# Patient Record
Sex: FEMALE | Race: WHITE | URBAN - METROPOLITAN AREA
[De-identification: names, ages, dates, MRNs, and addresses within clinical notes are randomized per-mention and may not be internally consistent; named-entity substitution may affect disease eponyms.]

---

## 2022-06-01 ENCOUNTER — OFFICE VISIT (OUTPATIENT)
Dept: OBGYN CLINIC | Facility: CLINIC | Age: 42
End: 2022-06-01
Payer: COMMERCIAL

## 2022-06-01 VITALS
HEART RATE: 78 BPM | BODY MASS INDEX: 22.15 KG/M2 | DIASTOLIC BLOOD PRESSURE: 74 MMHG | WEIGHT: 120.38 LBS | SYSTOLIC BLOOD PRESSURE: 116 MMHG | HEIGHT: 62 IN

## 2022-06-01 DIAGNOSIS — Z71.85 HPV VACCINE COUNSELING: ICD-10-CM

## 2022-06-01 DIAGNOSIS — Z13.0 SCREENING, ANEMIA, DEFICIENCY, IRON: ICD-10-CM

## 2022-06-01 DIAGNOSIS — Z13.21 SCREENING FOR ENDOCRINE, NUTRITIONAL, METABOLIC AND IMMUNITY DISORDER: ICD-10-CM

## 2022-06-01 DIAGNOSIS — Z30.09 GENERAL COUNSELING AND ADVICE FOR CONTRACEPTIVE MANAGEMENT: ICD-10-CM

## 2022-06-01 DIAGNOSIS — Z12.31 ENCOUNTER FOR SCREENING MAMMOGRAM FOR MALIGNANT NEOPLASM OF BREAST: ICD-10-CM

## 2022-06-01 DIAGNOSIS — Z80.41 FAMILY HISTORY OF OVARIAN CANCER: ICD-10-CM

## 2022-06-01 DIAGNOSIS — Z13.29 SCREENING FOR THYROID DISORDER: ICD-10-CM

## 2022-06-01 DIAGNOSIS — T19.2XXA RETAINED TAMPON, INITIAL ENCOUNTER: ICD-10-CM

## 2022-06-01 DIAGNOSIS — Z13.0 SCREENING FOR ENDOCRINE, NUTRITIONAL, METABOLIC AND IMMUNITY DISORDER: ICD-10-CM

## 2022-06-01 DIAGNOSIS — N92.3 INTERMENSTRUAL SPOTTING: ICD-10-CM

## 2022-06-01 DIAGNOSIS — Z13.220 SCREENING FOR LIPID DISORDERS: ICD-10-CM

## 2022-06-01 DIAGNOSIS — Z13.29 SCREENING FOR ENDOCRINE, NUTRITIONAL, METABOLIC AND IMMUNITY DISORDER: ICD-10-CM

## 2022-06-01 DIAGNOSIS — Z13.1 SCREENING FOR DIABETES MELLITUS: ICD-10-CM

## 2022-06-01 DIAGNOSIS — N92.0 MENORRHAGIA WITH REGULAR CYCLE: ICD-10-CM

## 2022-06-01 DIAGNOSIS — Z01.411 ENCOUNTER FOR WELL WOMAN EXAM WITH ABNORMAL FINDINGS: Primary | ICD-10-CM

## 2022-06-01 DIAGNOSIS — Z13.228 SCREENING FOR ENDOCRINE, NUTRITIONAL, METABOLIC AND IMMUNITY DISORDER: ICD-10-CM

## 2022-06-01 PROCEDURE — 87624 HPV HI-RISK TYP POOLED RSLT: CPT | Performed by: OBSTETRICS & GYNECOLOGY

## 2022-06-01 PROCEDURE — 3074F SYST BP LT 130 MM HG: CPT | Performed by: OBSTETRICS & GYNECOLOGY

## 2022-06-01 PROCEDURE — 99203 OFFICE O/P NEW LOW 30 MIN: CPT | Performed by: OBSTETRICS & GYNECOLOGY

## 2022-06-01 PROCEDURE — 99386 PREV VISIT NEW AGE 40-64: CPT | Performed by: OBSTETRICS & GYNECOLOGY

## 2022-06-01 PROCEDURE — 3008F BODY MASS INDEX DOCD: CPT | Performed by: OBSTETRICS & GYNECOLOGY

## 2022-06-01 PROCEDURE — 3078F DIAST BP <80 MM HG: CPT | Performed by: OBSTETRICS & GYNECOLOGY

## 2022-06-01 RX ORDER — AMOXICILLIN 250 MG
CAPSULE ORAL
COMMUNITY

## 2022-06-02 ENCOUNTER — TELEPHONE (OUTPATIENT)
Dept: OBGYN CLINIC | Facility: CLINIC | Age: 42
End: 2022-06-02

## 2022-06-02 LAB — HPV I/H RISK 1 DNA SPEC QL NAA+PROBE: NEGATIVE

## 2022-06-02 NOTE — TELEPHONE ENCOUNTER
RN spoke to Margot from THE [facility] lab. They received pt's specimen for hpv pap smear yesterday.

## 2022-06-07 PROBLEM — R87.615 UNSATISFACTORY CERVICAL PAPANICOLAOU SMEAR: Status: ACTIVE | Noted: 2022-06-01

## 2022-06-13 ENCOUNTER — TELEPHONE (OUTPATIENT)
Dept: OBGYN CLINIC | Facility: CLINIC | Age: 42
End: 2022-06-13

## 2022-11-10 ENCOUNTER — TELEPHONE (OUTPATIENT)
Facility: CLINIC | Age: 42
End: 2022-11-10

## 2022-11-10 ENCOUNTER — HOSPITAL ENCOUNTER (OUTPATIENT)
Dept: MAMMOGRAPHY | Facility: HOSPITAL | Age: 42
Discharge: HOME | End: 2022-11-10
Attending: OBSTETRICS & GYNECOLOGY
Payer: COMMERCIAL

## 2022-11-10 DIAGNOSIS — Z12.31 ENCOUNTER FOR SCREENING MAMMOGRAM FOR MALIGNANT NEOPLASM OF BREAST: ICD-10-CM

## 2022-11-10 PROCEDURE — 77063 BREAST TOMOSYNTHESIS BI: CPT | Performed by: OBSTETRICS & GYNECOLOGY

## 2022-11-10 PROCEDURE — 77067 SCR MAMMO BI INCL CAD: CPT | Performed by: OBSTETRICS & GYNECOLOGY

## 2022-11-10 NOTE — TELEPHONE ENCOUNTER
8/26/2020 Mammogram results: dense breast  Dr. Jero Willoughby recommends whole breast US  Routine screening mammography in 1  Year     Mammogram scheduled 11/10/22 today. Once resulted will call pt for recommendation. Advised pt to verify with her insurance if whole breast US is covered. Pt verb understanding.

## 2022-11-17 ENCOUNTER — OFFICE VISIT (OUTPATIENT)
Facility: CLINIC | Age: 42
End: 2022-11-17
Payer: COMMERCIAL

## 2022-11-17 VITALS
SYSTOLIC BLOOD PRESSURE: 116 MMHG | BODY MASS INDEX: 21.28 KG/M2 | DIASTOLIC BLOOD PRESSURE: 74 MMHG | HEART RATE: 70 BPM | HEIGHT: 62 IN | WEIGHT: 115.63 LBS

## 2022-11-17 DIAGNOSIS — R87.615 ENCOUNTER FOR REPEAT PAP SMEAR DUE TO PREVIOUS INSUFF CERVICAL CELLS: Primary | ICD-10-CM

## 2022-11-17 PROBLEM — R92.30 DENSE BREASTS: Status: ACTIVE | Noted: 2022-11-10

## 2022-11-17 PROBLEM — R92.2 DENSE BREASTS: Status: ACTIVE | Noted: 2022-11-10

## 2022-11-17 PROCEDURE — 87624 HPV HI-RISK TYP POOLED RSLT: CPT

## 2022-11-17 PROCEDURE — 3074F SYST BP LT 130 MM HG: CPT

## 2022-11-17 PROCEDURE — 88175 CYTOPATH C/V AUTO FLUID REDO: CPT

## 2022-11-17 PROCEDURE — 3078F DIAST BP <80 MM HG: CPT

## 2022-11-17 PROCEDURE — 99213 OFFICE O/P EST LOW 20 MIN: CPT

## 2022-11-17 PROCEDURE — 3008F BODY MASS INDEX DOCD: CPT

## 2022-11-18 ENCOUNTER — TELEPHONE (OUTPATIENT)
Facility: CLINIC | Age: 42
End: 2022-11-18

## 2022-11-18 LAB — HPV I/H RISK 1 DNA SPEC QL NAA+PROBE: NEGATIVE

## 2023-03-06 ENCOUNTER — TELEPHONE (OUTPATIENT)
Facility: CLINIC | Age: 43
End: 2023-03-06

## 2023-03-06 DIAGNOSIS — R92.2 BREAST DENSITY: ICD-10-CM

## 2023-03-06 DIAGNOSIS — N64.4 BREAST PAIN, LEFT: ICD-10-CM

## 2023-03-06 DIAGNOSIS — Z12.39 ENCOUNTER FOR BREAST CANCER SCREENING OTHER THAN MAMMOGRAM: Primary | ICD-10-CM

## 2023-03-06 NOTE — TELEPHONE ENCOUNTER
Spoke with pt. Had screening mammogram 8/26/22. Recommendation was for a whole breast ultrasound due to dense breast. Slight discomfort in left breast. I let her know mammography dept may require a unilateral diagnostic mammogram due to tenderness. Will pend breast ultrasound order and wait for mammography's recommendations for a left breast mammo. Verbalized understanding.

## 2023-03-07 PROBLEM — N64.4 BREAST PAIN, LEFT: Status: ACTIVE | Noted: 2023-03-07

## 2023-03-07 NOTE — TELEPHONE ENCOUNTER
Pt had wrong date of last mammogram. Last mammogram 11/10/22. Recommendations: Because of breast density this patient may benefit from supplemental screening with Molecular Breast Imaging (MBI) or bilateral screening breast ultrasound for increased sensitivity for detection of cancer which can be obscured mammographically. Will route for recommendations.

## 2023-03-08 NOTE — TELEPHONE ENCOUNTER
Spoke with pt. Aware DR. Sukh Frost placed an order for a bilateral diagnostic mammogram due to her complaints of left breast pain. Central scheduling number given. Verbalized understanding.

## 2023-03-14 ENCOUNTER — HOSPITAL ENCOUNTER (OUTPATIENT)
Dept: MAMMOGRAPHY | Facility: HOSPITAL | Age: 43
Discharge: HOME | End: 2023-03-14
Attending: OBSTETRICS & GYNECOLOGY
Payer: COMMERCIAL

## 2023-03-14 DIAGNOSIS — N64.4 BREAST PAIN, LEFT: ICD-10-CM

## 2023-03-14 PROCEDURE — 77066 DX MAMMO INCL CAD BI: CPT | Performed by: OBSTETRICS & GYNECOLOGY

## 2023-03-14 PROCEDURE — 76642 ULTRASOUND BREAST LIMITED: CPT | Performed by: OBSTETRICS & GYNECOLOGY

## 2023-03-14 PROCEDURE — 77062 BREAST TOMOSYNTHESIS BI: CPT | Performed by: OBSTETRICS & GYNECOLOGY

## 2023-03-15 NOTE — PROGRESS NOTES
Patient informed of diagnostic mammogram and US breast results and recommendation for routine screening mammogram in 12 months. Patient verbalizes understanding and agrees with the plan.

## 2023-12-31 ENCOUNTER — HOSPITAL ENCOUNTER (EMERGENCY)
Facility: HOSPITAL | Age: 43
Discharge: HOME | End: 2023-12-31
Attending: EMERGENCY MEDICINE
Payer: COMMERCIAL

## 2023-12-31 ENCOUNTER — APPOINTMENT (OUTPATIENT)
Dept: CT IMAGING | Facility: HOSPITAL | Age: 43
End: 2023-12-31
Attending: EMERGENCY MEDICINE
Payer: COMMERCIAL

## 2023-12-31 VITALS
BODY MASS INDEX: 19.1 KG/M2 | TEMPERATURE: 97 F | SYSTOLIC BLOOD PRESSURE: 102 MMHG | RESPIRATION RATE: 16 BRPM | OXYGEN SATURATION: 97 % | HEART RATE: 52 BPM | HEIGHT: 65 IN | DIASTOLIC BLOOD PRESSURE: 67 MMHG | WEIGHT: 114.63 LBS

## 2023-12-31 DIAGNOSIS — R11.2 NAUSEA AND VOMITING IN ADULT: Primary | ICD-10-CM

## 2023-12-31 LAB
ALBUMIN SERPL-MCNC: 4.6 G/DL (ref 3.4–5)
ALBUMIN/GLOB SERPL: 1.4 {RATIO} (ref 1–2)
ALP LIVER SERPL-CCNC: 55 U/L
ALT SERPL-CCNC: 20 U/L
ANION GAP SERPL CALC-SCNC: 5 MMOL/L (ref 0–18)
AST SERPL-CCNC: 11 U/L (ref 15–37)
B-HCG UR QL: NEGATIVE
BASOPHILS # BLD AUTO: 0.07 X10(3) UL (ref 0–0.2)
BASOPHILS NFR BLD AUTO: 0.8 %
BILIRUB SERPL-MCNC: 1.2 MG/DL (ref 0.1–2)
BILIRUB UR QL STRIP.AUTO: NEGATIVE
BUN BLD-MCNC: 12 MG/DL (ref 9–23)
CALCIUM BLD-MCNC: 9.5 MG/DL (ref 8.5–10.1)
CHLORIDE SERPL-SCNC: 101 MMOL/L (ref 98–112)
CLARITY UR REFRACT.AUTO: CLEAR
CO2 SERPL-SCNC: 30 MMOL/L (ref 21–32)
COLOR UR AUTO: YELLOW
CREAT BLD-MCNC: 0.89 MG/DL
EGFRCR SERPLBLD CKD-EPI 2021: 82 ML/MIN/1.73M2 (ref 60–?)
EOSINOPHIL # BLD AUTO: 0.1 X10(3) UL (ref 0–0.7)
EOSINOPHIL NFR BLD AUTO: 1.2 %
ERYTHROCYTE [DISTWIDTH] IN BLOOD BY AUTOMATED COUNT: 11.5 %
GLOBULIN PLAS-MCNC: 3.4 G/DL (ref 2.8–4.4)
GLUCOSE BLD-MCNC: 101 MG/DL (ref 70–99)
GLUCOSE UR STRIP.AUTO-MCNC: NORMAL MG/DL
HCT VFR BLD AUTO: 45.5 %
HGB BLD-MCNC: 16.5 G/DL
IMM GRANULOCYTES # BLD AUTO: 0.02 X10(3) UL (ref 0–1)
IMM GRANULOCYTES NFR BLD: 0.2 %
KETONES UR STRIP.AUTO-MCNC: 60 MG/DL
LEUKOCYTE ESTERASE UR QL STRIP.AUTO: NEGATIVE
LYMPHOCYTES # BLD AUTO: 2 X10(3) UL (ref 1–4)
LYMPHOCYTES NFR BLD AUTO: 23.7 %
MCH RBC QN AUTO: 31.1 PG (ref 26–34)
MCHC RBC AUTO-ENTMCNC: 36.3 G/DL (ref 31–37)
MCV RBC AUTO: 85.8 FL
MONOCYTES # BLD AUTO: 0.71 X10(3) UL (ref 0.1–1)
MONOCYTES NFR BLD AUTO: 8.4 %
NEUTROPHILS # BLD AUTO: 5.53 X10 (3) UL (ref 1.5–7.7)
NEUTROPHILS # BLD AUTO: 5.53 X10(3) UL (ref 1.5–7.7)
NEUTROPHILS NFR BLD AUTO: 65.7 %
NITRITE UR QL STRIP.AUTO: NEGATIVE
OSMOLALITY SERPL CALC.SUM OF ELEC: 282 MOSM/KG (ref 275–295)
PH UR STRIP.AUTO: 7 [PH] (ref 5–8)
PLATELET # BLD AUTO: 349 10(3)UL (ref 150–450)
POTASSIUM SERPL-SCNC: 3.7 MMOL/L (ref 3.5–5.1)
PROT SERPL-MCNC: 8 G/DL (ref 6.4–8.2)
PROT UR STRIP.AUTO-MCNC: 30 MG/DL
RBC # BLD AUTO: 5.3 X10(6)UL
SODIUM SERPL-SCNC: 136 MMOL/L (ref 136–145)
SP GR UR STRIP.AUTO: 1.03 (ref 1–1.03)
UROBILINOGEN UR STRIP.AUTO-MCNC: NORMAL MG/DL
WBC # BLD AUTO: 8.4 X10(3) UL (ref 4–11)

## 2023-12-31 PROCEDURE — S0028 INJECTION, FAMOTIDINE, 20 MG: HCPCS | Performed by: EMERGENCY MEDICINE

## 2023-12-31 PROCEDURE — 96361 HYDRATE IV INFUSION ADD-ON: CPT

## 2023-12-31 PROCEDURE — 74177 CT ABD & PELVIS W/CONTRAST: CPT | Performed by: EMERGENCY MEDICINE

## 2023-12-31 PROCEDURE — 85025 COMPLETE CBC W/AUTO DIFF WBC: CPT | Performed by: EMERGENCY MEDICINE

## 2023-12-31 PROCEDURE — 96375 TX/PRO/DX INJ NEW DRUG ADDON: CPT

## 2023-12-31 PROCEDURE — 99284 EMERGENCY DEPT VISIT MOD MDM: CPT

## 2023-12-31 PROCEDURE — 96374 THER/PROPH/DIAG INJ IV PUSH: CPT

## 2023-12-31 PROCEDURE — 99285 EMERGENCY DEPT VISIT HI MDM: CPT

## 2023-12-31 PROCEDURE — 81001 URINALYSIS AUTO W/SCOPE: CPT | Performed by: EMERGENCY MEDICINE

## 2023-12-31 PROCEDURE — 80053 COMPREHEN METABOLIC PANEL: CPT | Performed by: EMERGENCY MEDICINE

## 2023-12-31 PROCEDURE — 81025 URINE PREGNANCY TEST: CPT

## 2023-12-31 RX ORDER — ONDANSETRON 4 MG/1
4 TABLET, ORALLY DISINTEGRATING ORAL EVERY 4 HOURS PRN
Qty: 10 TABLET | Refills: 0 | Status: SHIPPED | OUTPATIENT
Start: 2023-12-31 | End: 2024-01-07

## 2023-12-31 RX ORDER — MORPHINE SULFATE 4 MG/ML
4 INJECTION, SOLUTION INTRAMUSCULAR; INTRAVENOUS ONCE
Status: COMPLETED | OUTPATIENT
Start: 2023-12-31 | End: 2023-12-31

## 2023-12-31 RX ORDER — ONDANSETRON 2 MG/ML
4 INJECTION INTRAMUSCULAR; INTRAVENOUS ONCE
Status: DISCONTINUED | OUTPATIENT
Start: 2023-12-31 | End: 2023-12-31

## 2023-12-31 RX ORDER — FAMOTIDINE 10 MG/ML
20 INJECTION, SOLUTION INTRAVENOUS ONCE
Status: COMPLETED | OUTPATIENT
Start: 2023-12-31 | End: 2023-12-31

## 2023-12-31 RX ORDER — ONDANSETRON 2 MG/ML
4 INJECTION INTRAMUSCULAR; INTRAVENOUS ONCE
Status: COMPLETED | OUTPATIENT
Start: 2023-12-31 | End: 2023-12-31

## 2023-12-31 RX ORDER — PANTOPRAZOLE SODIUM 40 MG/1
40 TABLET, DELAYED RELEASE ORAL DAILY
Qty: 30 TABLET | Refills: 0 | Status: SHIPPED | OUTPATIENT
Start: 2023-12-31 | End: 2024-01-30

## 2023-12-31 NOTE — ED INITIAL ASSESSMENT (HPI)
Pt reports mid upper abdominal pain and vomiting since. Denies any fevers or diarrhea. Pt reports burning pain that intensifies prior to her vomiting. States vomiting up to 4x a day.

## 2023-12-31 NOTE — DISCHARGE INSTRUCTIONS
Zofran for nausea  Drink plenty of fluids  Mylanta after meals and before bed  Start Protonix daily for 1 month  [place] diet advance as tolerated  Avoid caffeine and alcohol  Return if worse  Follow with your primary care physician next week  If symptoms are not improving may need evaluation by GI and possibly endoscopy.

## 2024-03-12 ENCOUNTER — TELEPHONE (OUTPATIENT)
Facility: CLINIC | Age: 44
End: 2024-03-12

## 2024-03-12 PROBLEM — R87.615 UNSATISFACTORY CERVICAL PAPANICOLAOU SMEAR: Status: RESOLVED | Noted: 2022-06-01 | Resolved: 2024-03-12

## 2024-03-12 NOTE — TELEPHONE ENCOUNTER
Discussed Dr. Vazquez recommendation to be seen in office by TK, appt tomm [id number]- agreed on POC Patient verbalized understanding, agreed to and intend to comply with plan of care.

## 2024-03-12 NOTE — TELEPHONE ENCOUNTER
Pt called to speak with a nurse about a left side breast pain that has gotten worse since last year, developed from a burning sensation to a sharp pain that comes an goes. [facility] scheduled for 4/10/24

## 2024-03-12 NOTE — TELEPHONE ENCOUNTER
C/o left side breast pain and inside her nipple that has gotten worse since last year, developed from a burning sensation to a intermittent sharp pain.    Last seen 11/2022    Onset few weeks     Hx same s/s last year dx mammogram and US was done 3/14/24. Has breast implants- pt can also reach out to her surgeon for recommendation, will route msg to Dr. Vazquez and call pt back.     [facility] scheduled for 4/10/24.     Patient verbalized understanding, agreed to and intend to comply with plan of care.

## 2024-03-13 ENCOUNTER — OFFICE VISIT (OUTPATIENT)
Facility: CLINIC | Age: 44
End: 2024-03-13
Payer: COMMERCIAL

## 2024-03-13 VITALS
HEIGHT: 62 IN | SYSTOLIC BLOOD PRESSURE: 120 MMHG | BODY MASS INDEX: 22.12 KG/M2 | HEART RATE: 75 BPM | DIASTOLIC BLOOD PRESSURE: 74 MMHG | WEIGHT: 120.19 LBS

## 2024-03-13 DIAGNOSIS — N64.4 BREAST PAIN, LEFT: Primary | ICD-10-CM

## 2024-03-13 PROCEDURE — 99212 OFFICE O/P EST SF 10 MIN: CPT

## 2024-03-13 NOTE — PROGRESS NOTES
Leigh Abbasi is a 43 year old female  Patient's last menstrual period was 2024 (exact date).   Chief Complaint   Patient presents with    Gyn Problem     Left breast constant pain started couple weeks ago     Other     Yes student    .  She had this same left breast pain last year.   OBSTETRICS HISTORY:  OB History    Para Term  AB Living   2 2 2     2   SAB IAB Ectopic Multiple Live Births           1      # Outcome Date GA Lbr Josse/2nd Weight Sex Delivery Anes PTL Lv   2 Term 14 39w0d   M Caesarean      1 Term 07 41w0d  7 lb (3.175 kg) F Caesarean   ROBB      Birth Comments: fetal heart tone issues. Wouldn't dilate      Obstetric Comments    - was difficult to conceive. Oral ovulation induction agent & inseminations & conceived right way.        GYNE HISTORY:      History   Sexual Activity    Sexual activity: Yes    Partners: Male    Birth control/ protection: Vasectomy                 MEDICAL HISTORY:  Past Medical History:   Diagnosis Date    Breast implant status     bilateral breast implants    Breast pain, left 2023    3/7/23 - c/o left breast pain at [facility]. Pap unsatisfactory   3/14/23 Diagnostic mammogram & US - A cyst in the left breast 4 o'clock position 7 cm from the nipple measures 3 x 4 x 2 mm.    Breast pain, left 2024    3/12/24 - Patient called to c/o left side breast pain and inside her nipple that has gotten worse since last year, developed from a burning sensation to a intermittent sharp pain. Onset a few weeks.    Cancer screening 2022    [employer] Common Hereditary Cancer Panel = Negative    Dense breasts 11/10/2022    c - Heterogeneously dense    Dysmenorrhea     Family history of ovarian cancer     mother, maternal grandmother    Genital herpes     1st outbreak occurred during 1st pregnancy     Menorrhagia     Open wound of finger 2008    sutures for laceration left thumb    Unsatisfactory cervical Papanicolaou smear  2022       SURGICAL HISTORY:  Past Surgical History:   Procedure Laterality Date    Abdominoplasty  2014      2007      2014    Enlarge breast with implant Bilateral 2014    [place] teeth removed         SOCIAL HISTORY:  Social History     Socioeconomic History    Marital status:      Spouse name: Not on file    Number of children: Not on file    Years of education: Not on file    Highest education level: Not on file   Occupational History    Not on file   Tobacco Use    Smoking status: Former    Smokeless tobacco: Never    Tobacco comments:     ocassionally in college   Vaping Use    Vaping Use: Never used   Substance and Sexual Activity    Alcohol use: Yes     Comment: occ    Drug use: Never    Sexual activity: Yes     Partners: Male     Birth control/protection: Vasectomy   Other Topics Concern    Not on file   Social History Narrative    Not on file     Social Determinants of Health     Financial Resource Strain: Not on file   Food Insecurity: Not on file   Transportation Needs: Not on file   Physical Activity: Not on file   Stress: Not on file   Social Connections: Not on file   Housing Stability: Not on file       FAMILY HISTORY:  Family History   Problem Relation Age of Onset    Diabetes Father         diet controlled     Ovarian Cancer Mother 59        was caught early. No chemo. Not sure about genetic mutations     No Known Problems Son     Ovarian Cancer Maternal Grandmother 40         when patient's mom was 10 years old.     Other (Liver cancer) Paternal Grandmother     Cancer Maternal Aunt 40        uncertain kind. Had chemo    No Known Problems Half-Sister     Stroke Maternal Grandfather     No Known Problems Daughter     Breast Cancer Neg     Colon Cancer Neg     Endometriosis Neg     Infertility Neg     Thyroid disease Neg     Clotting Disorder Neg     Birth Defects Neg     Bleeding Disorders Neg     Genetic Disease Neg     Osteoporosis Neg         MEDICATIONS:    Current Outpatient Medications:     Fish Oil-Cholecalciferol (FISH OIL + D3 OR), Take by mouth., Disp: , Rfl:     Multiple Vitamins-Minerals (BIOTIN PLUS/CALCIUM/VIT D3) Oral Tab, Take by mouth., Disp: , Rfl:     ALLERGIES:  No Known Allergies      PHYSICAL EXAM:   Physical Exam  Chest:   Breasts:     Right: Normal. No swelling, bleeding, inverted nipple, mass, nipple discharge, skin change or tenderness.      Left: Tenderness present. No swelling, bleeding, inverted nipple, mass, nipple discharge or skin change.      Comments: Has tenderness and pain at the outer quadrant from the 1 oclock to 6 oclock position and has pain in the left areola area.           Assessment & Plan:  1. Breast pain, left    - DUANE DIAGNOSTIC LEFT (CPT=77065); Future  Discussed with patient if diagnostic mammogram is benign, recommend to follow up with her plastic surgeon or can refer her to Dr. Ford.

## 2024-03-25 ENCOUNTER — TELEPHONE (OUTPATIENT)
Facility: CLINIC | Age: 44
End: 2024-03-25

## 2024-03-25 DIAGNOSIS — N64.4 BREAST PAIN: Primary | ICD-10-CM

## 2024-03-25 NOTE — TELEPHONE ENCOUNTER
Per Temitope from the  mammography department, pt needs a bilateral study, she needs bilateral diagnostic and augmented because of the implants. Pt is due for both sides. Please advise and correct order. Thanks

## 2024-03-26 ENCOUNTER — HOSPITAL ENCOUNTER (OUTPATIENT)
Dept: MAMMOGRAPHY | Facility: HOSPITAL | Age: 44
Discharge: HOME | End: 2024-03-26
Payer: COMMERCIAL

## 2024-03-26 DIAGNOSIS — N64.4 BREAST PAIN: ICD-10-CM

## 2024-03-26 PROCEDURE — 77066 DX MAMMO INCL CAD BI: CPT

## 2024-03-26 PROCEDURE — 77062 BREAST TOMOSYNTHESIS BI: CPT

## 2024-03-26 PROCEDURE — 76642 ULTRASOUND BREAST LIMITED: CPT

## 2024-03-27 DIAGNOSIS — Z80.41 FAMILY HISTORY OF OVARIAN CANCER: Primary | ICD-10-CM

## 2024-03-27 DIAGNOSIS — N64.4 BREAST PAIN: ICD-10-CM

## 2024-03-27 NOTE — PROGRESS NOTES
Patient aware of mammogram and US results and recommendations. A breast MRI is recommended for this patient. Please give her information to follow up  with Dr. Ford. [employer] message sent per patient request. Patient verbalized understanding.

## 2024-06-13 NOTE — CONSULTS
Breast Surgery New Patient Consultation    Leigh Abbasi is a 44 year old patient, referred by Meghana NIEVES, also a patient of Dr. Vazquez, who presents for evaluation of breast pain.    History of Present Illness:   Ms. Leigh Abbasi is a 44 year old woman with a past history significant for bilateral breast implants who presents for evaluation of breast pain.     Her pain has been intermittent for about 2 years. She states it is like a \"discomfort\" or \"soreness\". She has had breast implants for 10 years and is contemplating removal/breast reduction. She wants to make sure she is healthy before a cosmetic surgery.     Her most recent mammogram was on 3/26/2024.  Breast tissue was category C density.  There were no significant findings on mammogram or ultrasound.  An MRI breast was recommended due to persistent breast pain and a family history of ovarian cancer.  Her mammogram was deemed BIRAD 0 due to this. Mammogram in March 2023 showed a cyst in the left breast 4:00, 7 cm from the nipple, measuring 3 x 4 x 2 mm.    She denies any palpable masses, nipple discharge, skin changes, or axillary adenopathy.  She does not have significant past history for breast diseases or breast biopsy. She does not have family history of breast cancer.  She has a family history of ovarian cancer.  Her mother had ovarian cancer at 55 years old and is still alive at 68, her maternal grandmother had ovarian cancer at 40 years old and passed away at 45.  She does have a history of genetic testing in 2023 which was negative.          Past Medical History:    Breast implant status    bilateral breast implants    Breast pain, left    3/7/23 - c/o left breast pain at [facility]. Pap unsatisfactory   3/14/23 Diagnostic mammogram & US - A cyst in the left breast 4 o'clock position 7 cm from the nipple measures 3 x 4 x 2 mm.    Breast pain, left    3/12/24 - Patient called to c/o left side breast pain and inside her nipple that has  gotten worse since last year, developed from a burning sensation to a intermittent sharp pain. Onset a few weeks.    Cancer screening    Inv[employer]ae Common Hereditary Cancer Panel = Negative    Dense breasts    c - Heterogeneously dense    Dysmenorrhea    Family history of ovarian cancer    mother, maternal grandmother    Genital herpes    1st outbreak occurred during 1st pregnancy     Menorrhagia    Open wound of finger    sutures for laceration left thumb    Unsatisfactory cervical Papanicolaou smear       Past Surgical History:   Procedure Laterality Date    Abdominoplasty                  Enlarge breast with implant Bilateral     [place] teeth removed         Gynecological History:  Menarche at age 10 and LMP 6/15/24  Pt is a   Pt was 27 years old at time of first pregnancy.    She has cumulative breastfeeding history of 12 months  Age of Menopause: n/a  Type: n/a  She denies any history of hormone replacement therapy   She has history of oral contraceptive use for 5 years, last .   She has recieved infertility treatment to achieve pregnancy.    Medications:     Fish Oil-Cholecalciferol (FISH OIL + D3 OR) Take by mouth.      Multiple Vitamins-Minerals (BIOTIN PLUS/CALCIUM/VIT D3) Oral Tab Take by mouth.         Allergies:    No Known Allergies    Family History:   Family History   Problem Relation Age of Onset    Diabetes Father         diet controlled     Ovarian Cancer Mother 59        was caught early. No chemo. Not sure about genetic mutations     No Known Problems Son     Ovarian Cancer Maternal Grandmother 40         when patient's mom was 10 years old.     Other (Liver cancer) Paternal Grandmother     Cancer Maternal Aunt 40        uncertain kind. Had chemo    No Known Problems Half-Sister     Stroke Maternal Grandfather     No Known Problems Daughter     Breast Cancer Neg     Colon Cancer Neg     Endometriosis Neg     Infertility Neg     Thyroid disease Neg      Clotting Disorder Neg     Birth Defects Neg     Bleeding Disorders Neg     Genetic Disease Neg     Osteoporosis Neg        She is not of Ashkenazi Christianity ancestry.    Social History:  History   Alcohol Use    Yes     Comment: occ       History   Smoking Status    Former   Smokeless Tobacco    Never     Comment: ocassionally in college       Review of Systems:    Review of Systems   Constitutional:  Negative for activity change, appetite change, chills, fatigue and unexpected weight change.   HENT:  Negative for ear pain, hearing loss, nosebleeds, sore throat, trouble swallowing and voice change.    Eyes:  Negative for pain and visual disturbance.   Respiratory:  Negative for cough, chest tightness and shortness of breath.    Cardiovascular:  Negative for chest pain, palpitations and leg swelling.   Gastrointestinal:  Negative for nausea, vomiting, abdominal pain, diarrhea, constipation and blood in stool.   Endocrine: Negative for cold intolerance and heat intolerance.   Genitourinary:  Negative for dysuria, hematuria and difficulty urinating.   Musculoskeletal:  Negative for back pain, joint swelling, joint pain and neck pain.   Skin:  Negative for color change, rash and wound.   Allergic/Immunologic: Negative for environmental allergies.   Neurological:  Negative for tremors, syncope, facial asymmetry, speech difficulty and weakness.   Hematological:  Negative for adenopathy. Does not bruise/bleed easily.   Psychiatric/Behavioral:  Negative for hallucinations, behavioral problems, confusion, agitation and depressed mood.       Otherwise as per HPI.    Physical Exam:    /65 (BP Location: Right arm, Patient Position: Sitting, Cuff Size: adult)   Pulse 55   Temp 98.5 °F (36.9 °C)   Resp 16   Wt 53.1 kg (117 lb)   LMP 03/13/2024 (Exact Date)   SpO2 99%   BMI 21.40 kg/m²     Physical Exam  Vitals reviewed.   Constitutional:       Appearance: Normal appearance.   HENT:      Head: Normocephalic and  atraumatic.   Eyes:      Extraocular Movements: Extraocular movements intact.      Pupils: Pupils are equal, round, and reactive to light.   Cardiovascular:      Rate and Rhythm: Normal rate.   Pulmonary:      Effort: Pulmonary effort is normal.   Chest:   Breasts:     Right: Normal. No mass, nipple discharge, skin change or tenderness.      Left: Normal. No mass, nipple discharge, skin change or tenderness.      Comments: Bilateral implants present  Abdominal:      General: Abdomen is flat.      Palpations: Abdomen is soft.   Musculoskeletal:         General: Normal range of motion.      Cervical back: Normal range of motion and neck supple.   Lymphadenopathy:      Upper Body:      Right upper body: No supraclavicular or axillary adenopathy.      Left upper body: No supraclavicular or axillary adenopathy.   Skin:     General: Skin is warm and dry.   Neurological:      General: No focal deficit present.      Mental Status: She is alert and oriented to person, place, and time.   Psychiatric:         Mood and Affect: Mood normal.            Labs/imaging: reviewed in EPIC    Impression:   Ms. Leigh Abbasi is a 44 year old woman presents for high risk of breast cancer and left breast pain    Discussion and Plan:  I had a discussion with the Patient regarding her breast exam. On exam today I found no evidence of disease. I personally reviewed her recent imaging and we discussed this.    We discussed the patient's breast pain. I explained that breast pain is usually secondary to hormonal shifts and not usually a sign of worrisome pathology. There were no masses on physical exam and her most recent imaging is reassuring. I explained that most of the time breast pain will dissipate with time.  We also discussed abstaining from caffeine. We discussed wearing a good, supportive, well-fitted bra in the correct size. If needed, the bra can be worn 24/7 for support. The patient may use NSAIDs as needed and primrose oil may  be helpful.     We calculated her Tyrer Cuzick score, which revealed an estimated lifetime breast cancer risk of approximately 10% and a 10-year breast cancer risk of 1.9%. (Results will be scanned into the chart.)     Genetic counseling: she has completed genetic testing and was negative    Further screening:   Mammogram: Next annual mammogram March 2025 (ordered)  Screening ultrasound: Will start screening ultrasound due to dense breast tissue if MRI is not able to be completed  MRI: ordered breast MRI to follow up on diagnostic mammogram. Patient has been having issues with insurance approval     Chemoprophylaxis: n/a    Return to clinic:   1 year for breast exam  See PCP/OB/GYN at least annually for additional breast exam    She was given ample opportunity for questions and those questions were answered to her satisfaction. She has been  encouraged to contact the office with any questions or concerns prior to her next appointment. This encounter lasted a total of 40 minutes, more than 50% of which was dedicated to the discussion of management options.     Kayla Ford MD  Breast Surgical Oncology      CC: Dr. George NIEVES

## 2024-06-18 ENCOUNTER — OFFICE VISIT (OUTPATIENT)
Dept: SURGERY | Facility: CLINIC | Age: 44
End: 2024-06-18

## 2024-06-18 VITALS
BODY MASS INDEX: 21 KG/M2 | SYSTOLIC BLOOD PRESSURE: 124 MMHG | HEART RATE: 55 BPM | OXYGEN SATURATION: 99 % | WEIGHT: 117 LBS | RESPIRATION RATE: 16 BRPM | TEMPERATURE: 99 F | DIASTOLIC BLOOD PRESSURE: 65 MMHG

## 2024-06-18 DIAGNOSIS — R92.333 HETEROGENEOUSLY DENSE TISSUE OF BOTH BREASTS ON MAMMOGRAPHY: Primary | ICD-10-CM

## 2024-06-18 PROCEDURE — 99204 OFFICE O/P NEW MOD 45 MIN: CPT | Performed by: SURGERY

## 2024-06-18 NOTE — PATIENT INSTRUCTIONS
Annual mammogram for screening has been ordered march 2025  MRI has been re-ordered  You also qualify for screening ultrasound, will order when we figure out if MRI covered or not  Return for breast exam in 1 year

## 2024-08-21 PROBLEM — D12.5 BENIGN NEOPLASM OF SIGMOID COLON: Status: ACTIVE | Noted: 2024-08-21

## 2024-08-21 PROBLEM — Z12.11 SPECIAL SCREENING FOR MALIGNANT NEOPLASM OF COLON: Status: ACTIVE | Noted: 2024-08-21

## 2024-08-21 PROBLEM — K21.9 GASTROESOPHAGEAL REFLUX DISEASE: Status: ACTIVE | Noted: 2024-08-21

## 2024-08-21 PROBLEM — R11.12 PROJECTILE VOMITING: Status: ACTIVE | Noted: 2024-08-21

## 2024-11-04 ENCOUNTER — NURSE ONLY (OUTPATIENT)
Dept: LAB | Age: 44
End: 2024-11-04
Attending: INTERNAL MEDICINE
Payer: COMMERCIAL

## 2024-11-04 PROCEDURE — 83013 H PYLORI (C-13) BREATH: CPT | Performed by: INTERNAL MEDICINE

## 2025-03-10 ENCOUNTER — TELEPHONE (OUTPATIENT)
Facility: CLINIC | Age: 45
End: 2025-03-10

## 2025-03-10 DIAGNOSIS — M25.511 RIGHT SHOULDER PAIN, UNSPECIFIED CHRONICITY: Primary | ICD-10-CM

## 2025-03-10 NOTE — TELEPHONE ENCOUNTER
New pt appt for rt shoulder/bicep no imaging avail, pt advised to arrive early for imaging   Future Appointments   Date Time Provider Department Center   3/27/2025 11:40 AM Beto Bagley,  [facility]

## 2025-05-08 ENCOUNTER — OFFICE VISIT (OUTPATIENT)
Facility: CLINIC | Age: 45
End: 2025-05-08
Payer: COMMERCIAL

## 2025-05-08 VITALS
BODY MASS INDEX: 20.49 KG/M2 | HEIGHT: 65 IN | SYSTOLIC BLOOD PRESSURE: 104 MMHG | HEART RATE: 62 BPM | DIASTOLIC BLOOD PRESSURE: 62 MMHG | WEIGHT: 123 LBS

## 2025-05-08 DIAGNOSIS — Z28.21 HUMAN PAPILLOMA VIRUS (HPV) VACCINATION DECLINED: ICD-10-CM

## 2025-05-08 DIAGNOSIS — Z12.4 SCREENING FOR CERVICAL CANCER: ICD-10-CM

## 2025-05-08 DIAGNOSIS — Z80.41 FAMILY HISTORY OF OVARIAN CANCER: ICD-10-CM

## 2025-05-08 DIAGNOSIS — N92.0 MENORRHAGIA WITH REGULAR CYCLE: ICD-10-CM

## 2025-05-08 DIAGNOSIS — Z98.82 BREAST IMPLANT STATUS: ICD-10-CM

## 2025-05-08 DIAGNOSIS — N88.9 CERVIX ABNORMALITY: ICD-10-CM

## 2025-05-08 DIAGNOSIS — R92.30 DENSE BREASTS: ICD-10-CM

## 2025-05-08 DIAGNOSIS — Z12.73 SCREENING FOR OVARIAN CANCER: ICD-10-CM

## 2025-05-08 DIAGNOSIS — Z01.411 ENCOUNTER FOR WELL WOMAN EXAM WITH ABNORMAL FINDINGS: Primary | ICD-10-CM

## 2025-05-08 PROCEDURE — 88175 CYTOPATH C/V AUTO FLUID REDO: CPT | Performed by: OBSTETRICS & GYNECOLOGY

## 2025-05-08 PROCEDURE — 99396 PREV VISIT EST AGE 40-64: CPT | Performed by: OBSTETRICS & GYNECOLOGY

## 2025-05-08 PROCEDURE — 87624 HPV HI-RISK TYP POOLED RSLT: CPT | Performed by: OBSTETRICS & GYNECOLOGY

## 2025-05-08 PROCEDURE — 99213 OFFICE O/P EST LOW 20 MIN: CPT | Performed by: OBSTETRICS & GYNECOLOGY

## 2025-05-08 PROCEDURE — 88305 TISSUE EXAM BY PATHOLOGIST: CPT | Performed by: OBSTETRICS & GYNECOLOGY

## 2025-05-08 PROCEDURE — 57500 BIOPSY OF CERVIX: CPT | Performed by: OBSTETRICS & GYNECOLOGY

## 2025-05-08 RX ORDER — MULTIVIT-MIN/IRON/FOLIC ACID/K 18-600-40
CAPSULE ORAL
COMMUNITY

## 2025-05-08 NOTE — PROCEDURES
2025    Procedure: Biopsy of cervix     Pre-op Dx: Lesion of ectocervix at 9 o'clock     Post-op Dx: Same    Indication: 44 year old  female with menorrhagia here for well woman exam. Incidentally noted approximately 5 mm irregularly bordered blue-gray area located at 9 o'clock on the ectocervix. Non friable. Recommended biopsy. Patient in agreement to proceed.     Risks, benefits, alternatives discussed.     Speculum already in place  Punch biopsy performed at 9 o'clock - this did not feel very solid and there was not much bleeding. Could be that this was just an unusual appearing Nabothian cyst.   Monsel's solution applied.   Hemostatic biopsy site  Speculum removed.   Tolerated well  EBL minimal  Specimen: biopsy of cervical lesion at 9 o'clock     Nirali Vazquez MD

## 2025-05-08 NOTE — H&P
[facility]  Obstetrics and Gynecology   History & Physical  NEW    Chief complaint:   Chief Complaint   Patient presents with    Wellness Visit     Last pap 22 neg        Subjective:     HPI: Leigh Abbasi is a 44 year old  Patient's last menstrual period was 2025 (approximate).   Here for [employer]. Reports periods are still regular, but the first 2 days are quite heavy. Would like to have pelvic US done as previously advised   Chaperone declines    PMH Menorrhagia, Persistent issues with left breast pain, breast density. +FHx ovarian cancer in her mother & matGM (patient with negative genetic testing 2022)    ~~~~~~  22 Initial visit with me - [employer] & c/o unusual spotting x 4 days prior to onset of last period. Last month had a period slightly early - had spotting starting on  & then full bleeding . Has never had this problem before.   Usual cramping. Not sure if was cramping with the spotting. Some night sweats - can feel cold at times. Right before & at the start of the period. This comes & goes. Gets it just a few times a year. Labs - thinks last was about 1 year ago & was normal. Pelvic US - nothing since pregnancy in . Exam: Retained tampon removed from vagina. Pelvic US & labs ordered. Pap & HPV done though returned unsatisfactory. Discussed HPV vaccine, genetic counseling, hereditary mutation testing, consideration of screening for ovarian cancer with pelvic US & CA-125 though limited utility.     ~~~~~~~~~  22 Pap & HPV negative  - Meghana Abebe APRN  ~~~~~~~~~~~  3/6/23 Patient called office c/o discomfort left breast. Diagnostic mammogram ordered.   3/14/23 Diagnostic mammogram - tiny benign cyst left breast. Benign   ~~~~~~~~~~~~  3/12/24 Pt called office c/o left breast pain again - left side breast pain and inside her nipple that has gotten worse since last year, developed from a burning sensation to a intermittent sharp pain. For a few weeks.      ~~~~~~~~~~~~~  3/13/24 Meghana Abebe APRN - Left breast pain. Exam Has tenderness and pain at the outer quadrant from the 1 oclock to 6 oclock position and has pain in the left areola area. Discussed with patient if diagnostic mammogram is benign, recommend to follow up with her plastic surgeon or can refer her to Dr. Ford (breast surgeon)    ~~~~~~~  3/26/24 Diagnostic mammogram. Cat C. No findings. Given symptoms, recommend MRI breasts     ~~~~~~~~~~~~~~~  6/18/24 Kayla Ford MD - breast surgeon consult. On exam today I found no evidence of disease. Tyrer Cuzick score, which revealed an estimated lifetime breast cancer risk of approximately 10% and a 10-year breast cancer risk of 1.9%. (Results will be scanned into the chart.)   Mammogram: Next annual mammogram March 2025 (ordered)  Screening ultrasound: Will start screening ultrasound due to dense breast tissue if MRI is not able to be completed  MRI: ordered breast MRI to follow up on diagnostic mammogram. Patient has been having issues with insurance approval  Return to clinic:   1 year for breast exam  See PCP/OB/GYN at least annually for additional breast exam  ~~~~~~~~~~~~~~~~~    As of today, 5/8/2025 [employer].   Heavy periods always. Within the last year the bleeding is heavier & more cramping. 28-30 days x Total 5 days of bleeding, about 2 are very heavy. Cramping itself tolerable. Changes super plus tampon 4 times on heaviest day. Sometimes gets up in the middle of the night to change it as well  Mild to moderate dysmenorrhea. Can still work out. No absenteeism.    Doesn't feel that the bleeding is terrible. She doesn't think it is bad enough that she would want to start an oral contraceptive pill at this time. It is not impacting her life or limiting her function at this time. She would like to have a pelvic US though    Breast pain  -Cut down on caffeine - more tea than coffee if needs something.   -hasn't been a problem lately     Patient  Care Team:  Pcp, None as PCP - General  Kayla Ford MD (Surgical Oncology)     Review of Systems   Constitutional:  Negative for unexpected weight change.        Stable weight.   Works out a lot   Gastrointestinal:  Negative for blood in stool, constipation, diarrhea, nausea and vomiting.        8/2024 Diagnosed with H.pylori. Was having chronic nausea & vomiting. GI treated her. Feels much better. Confirmed it was cleared.     BM every day.    Endocrine:        Some night sweats - can feel cold at times. Right before & at the start of the period. This comes & goes. Pretty much monthly now. Sometimes the sweats are mild & other times can be intense.      Genitourinary:  Positive for menstrual problem. Negative for difficulty urinating, dyspareunia, dysuria, frequency, hematuria, pelvic pain, vaginal bleeding and vaginal discharge.        Sexually active? Yes   Dyspareunia? No   Postcoital bleeding? No   Contraception: vasectomy     Breasts  -left breast pain for long time with negative imaging, dense breasts  -5/8/25 is gone    Drinks a lot of water  Very physically active  Does void a lot but good volumes.    Skin:         +moles & freckles   -no noticed changes  -no derm yet    Neurological:         Had a little dizziness with the most recent period.   Rare HA     GYN Hx:   Menarche 11 x 28-30 days x 5-7 days  Pt was 27 years old at time of first pregnancy.    She has cumulative breastfeeding history of 12 months  She denies any history of hormone replacement therapy   She has history of oral contraceptive use for 5 years, last 2005.   She has recieved infertility treatment to achieve pregnancy.    6/1/22  Heavy flow for 2 days then tapers off to light. Not horrible. Overall tolerable.   Since 2014 delivery noticed the periods got heavier & more cramping.   Had moved to [place] & was advised to try OCP to help with bleeding. Doesn't think she actually tried OCP  Overall feels her bleeding is tolerable.   Changes  super plus tampon 4 times on heaviest day.   Sometimes gets up in the middle of the night to change it as well  Mild to moderate dysmenorrhea.   Can still work out. No absenteeism  Will have the cramping about the day before the period comes.   The cramping lasts mainly during the heavy days  No clots.   No intermenstrual bleeding usually.     Contraception: vasectomy   STDs: genital herpes  HPV vaccine no     FHx ovarian cancer - Mother, MGM  Hereditary cancer mutation testing of affected individuals? No   Hereditary mutation testing for patient? 22 [facility] Common Hereditary Cancer Panel = Negative (47 genes)     Cervical cancer screening:   No abn pap   2021 Pap in [place]. Per patient was normal.   22 Pap Unsatisfactory  22 Pap & HPV negative  - Meghana Abebe APRN    Breast cancer screening:  Mammogram:  - normal   10/10/22 Screening mammogram dense, benign  3/14/23 Diagnostic mammogram - tiny benign cyst left breast. Benign   3/26/24 Diagnostic mammogram. Cat C. No findings. Given symptoms, recommend MRI breasts    Colon cancer screenin2024 EGD +H.pylori  2024 Colonoscopy -hyperplastic polyps. Recall 10 years    Osteoporosis screening:  DEXA scan - no     OB History:  OB History    Para Term  AB Living   2 2 2   2   SAB IAB Ectopic Multiple Live Births       1      # Outcome Date GA Lbr Josse/2nd Weight Sex Type Anes PTL Lv   2 Term 14 39w0d   M Caesarean      1 Term 07 41w0d  7 lb (3.175 kg) F Caesarean   ROBB      Birth Comments: fetal heart tone issues. Wouldn't dilate      Obstetric Comments    - was difficult to conceive. Oral ovulation induction agent & inseminations & conceived right way.        Meds:  Current Outpatient Medications on File Prior to Visit   Medication Sig Dispense Refill    Cholecalciferol (VITAMIN D) 50 MCG ( [place]) Oral Cap Take by mouth.      Omeprazole 40 MG Oral Capsule Delayed Release Take one tablet ( 40 mg ) by  mouth once daily, 30 minutes for a meal, for 8 ( eight ) weeks. (Patient not taking: Reported on 2025) 60 capsule 0    MAGNESIUM OR Take by mouth.      Fish Oil-Cholecalciferol (FISH OIL + D3 OR) Take by mouth.      Multiple Vitamins-Minerals (BIOTIN PLUS/CALCIUM/VIT D3) Oral Tab Take by mouth.       No current facility-administered medications on file prior to visit.       All:  No Known Allergies    PMH:  Past Medical History:    Abdominal pain    Back pain    Bad breath    Bloating    Breast implant status    bilateral breast implants    Breast pain, left    3/7/23 - c/o left breast pain at [employer]. Pap unsatisfactory   3/14/23 Diagnostic mammogram & US - A cyst in the left breast 4 o'clock position 7 cm from the nipple measures 3 x 4 x 2 mm.    Breast pain, left    3/12/24 - Patient called to c/o left side breast pain and inside her nipple that has gotten worse since last year, developed from a burning sensation to a intermittent sharp pain. Onset a few weeks.    Cancer screening    [facility] Common Hereditary Cancer Panel = Negative (47 genes)    Dense breasts    c - Heterogeneously dense    Dizziness    Dysmenorrhea    Family history of ovarian cancer    mother, maternal grandmother    Frequent urination    Genital herpes    1st outbreak occurred during 1st pregnancy     H. pylori infection    Diagnosed on EGD biopsy    Heartburn    Indigestion    Loss of appetite    Menorrhagia    Nausea    Open wound of finger    sutures for laceration left thumb    Sleep disturbance    Uncomfortable fullness after meals    Unsatisfactory cervical Papanicolaou smear    Vomiting       PSH:  Past Surgical History:   Procedure Laterality Date    Abdominoplasty                  Colonoscopy & polypectomy  2024    hyperplastic polyp. Recall 10 years    Egd  2024    + H pylori    Enlarge breast with implant Bilateral     [place] teeth removed         Social History:  Social History      Socioeconomic History    Marital status:    Tobacco Use    Smoking status: Former     Types: Cigarettes    Smokeless tobacco: Never    Tobacco comments:     Only social back in college, never purchased a pack.   Vaping Use    Vaping status: Never Used   Substance and Sexual Activity    Alcohol use: Yes     Alcohol/week: 4.0 - 5.0 standard drinks of alcohol     Types: 3 - 4 Glasses of wine, 1 Standard drinks or equivalent per week     Comment: Social drinker    Drug use: Never    Sexual activity: Yes     Partners: Male     Birth control/protection: Vasectomy     Social Drivers of Health      Received from [facility]    Housing Stability        Family History:  Family History   Problem Relation Age of Onset    Hypertension Mother     Ovarian Cancer Mother 59        was caught early. No chemo. Not sure about genetic mutations     Diabetes Father         diet controlled     Ovarian Cancer Maternal Grandmother 40         when patient's mom was 10 years old.     Stroke Maternal Grandfather     Cancer Paternal Grandmother         Liver Cancer    Other (Liver cancer) Paternal Grandmother     Diabetes Paternal Grandfather     No Known Problems Daughter     No Known Problems Son     No Known Problems Half-Sister     Breast Cancer Neg     Colon Cancer Neg     Endometriosis Neg     Infertility Neg     Thyroid disease Neg     Clotting Disorder Neg     Birth Defects Neg     Bleeding Disorders Neg     Genetic Disease Neg     Osteoporosis Neg     Skin cancer Neg     DVT/VTE Neg     Heart Attack Neg        Objective:     Vitals:    25 1020   BP: 104/62   Pulse: 62   Weight: 123 lb (55.8 kg)   Height: 65\"         Body mass index is 20.47 kg/m².    Physical Exam:  Physical Exam  Vitals and nursing note reviewed.   Constitutional:       Appearance: Normal appearance.   HENT:      Head: Normocephalic and atraumatic.   Eyes:      Extraocular Movements: Extraocular movements intact.       Conjunctiva/sclera: Conjunctivae normal.   Neck:      Comments: No thyromegaly  Cardiovascular:      Rate and Rhythm: Normal rate and regular rhythm.      Heart sounds: No murmur heard.  Pulmonary:      Effort: Pulmonary effort is normal.      Breath sounds: Normal breath sounds.      Comments: Breasts: no masses, no axillary lymphadenopathy. +Bilateral breast implants  Abdominal:      General: There is no distension.      Palpations: Abdomen is soft. There is no mass.      Tenderness: There is no abdominal tenderness. There is no guarding or rebound.      Hernia: No hernia is present.      Comments: Old healed incisions around umbilicus and along lower pelvis.    Genitourinary:     Comments: VULVA: normal appearing vulva with no masses, tenderness or lesions  URETHRA: unremarkable   PERINEUM: intact  VAGINA: normal discharge, slightly redundant vaginal walls   CERVIX: approximately 5 mm irregularly bordered blue-gray area located at 9 o'clock on the ectocervix. This area was not friable when probed with cotton swab. Verbally consented patient to perform a biopsy. See biopsy note   UTERUS: uterus is anteverted, slightly bulky/globular & firm, but normal size & non-tender  ADNEXA: normal adnexa in size, nontender and no masses  PELVIC FLOOR: no hypertonicity, no tenderness       Musculoskeletal:         General: No tenderness.      Right lower leg: No edema.      Left lower leg: No edema.   Skin:     Comments: Multiple moles, freckles especially chest, upper back, shoulders   Neurological:      General: No focal deficit present.      Mental Status: She is alert.   Psychiatric:         Mood and Affect: Mood normal.         Behavior: Behavior normal.         Thought Content: Thought content normal.         Judgment: Judgment normal.         Labs:  Lab Results   Component Value Date    WBC 8.4 12/31/2023    RBC 5.30 12/31/2023    HGB 16.5 (H) 12/31/2023    HCT 45.5 12/31/2023    MCV 85.8 12/31/2023    MCH 31.1  12/31/2023    MCHC 36.3 12/31/2023    RDW 11.5 12/31/2023    .0 12/31/2023        Lab Results   Component Value Date     (H) 12/31/2023    BUN 12 12/31/2023    CREATSERUM 0.89 12/31/2023    ANIONGAP 5 12/31/2023    CA 9.5 12/31/2023    OSMOCALC 282 12/31/2023    ALKPHO 55 12/31/2023    AST 11 (L) 12/31/2023    ALT 20 12/31/2023    BILT 1.2 12/31/2023    TP 8.0 12/31/2023    ALB 4.6 12/31/2023    GLOBULIN 3.4 12/31/2023     12/31/2023    K 3.7 12/31/2023     12/31/2023    CO2 30.0 12/31/2023       No results found for: \"CHOLEST\", \"TRIG\", \"HDL\", \"LDL\", \"VLDL\", \"TCHDLRATIO\", \"NONHDLC\", \"CHOLHDLRATIO\", \"CALCNONHDL\"     No results found for: \"T4F\", \"TSH\", \"TSHT4\"     No results found for: \"EAG\", \"A1C\"    Imaging:  No results found.     PROCEDURE:  CT ABDOMEN PELVIS IV CONTRAST, NO ORAL (ER)     COMPARISON:  None.     INDICATIONS:  abdominal gustavo and vomiting     TECHNIQUE:  CT scanning was performed from the dome of the diaphragm to the pubic symphysis with non-ionic intravenous contrast material. Post contrast coronal MPR imaging was performed.  Dose reduction techniques were used. Dose information is  transmitted to the ACR (American College of Radiology) NRDR (National Radiology Data Registry) which includes the Dose Index Registry.     PATIENT STATED HISTORY:(As transcribed by Technologist)  Vomiting since 12/25/23, unable to eat. RUQ,LUQ pain.      CONTRAST USED:  65cc of Isovue 370     FINDINGS:    LIVER:  No enlargement, atrophy, abnormal density, or significant focal lesion.    BILIARY:  No visible dilatation or calcification.    PANCREAS:  No lesion, fluid collection, ductal dilatation, or atrophy.    SPLEEN:  No enlargement or focal lesion.    KIDNEYS:  No hydronephrosis or nephrolithiasis.  Subcentimeter right right renal lesions, too small to characterize.  ADRENALS:  No mass or enlargement.    AORTA/VASCULAR:  No aneurysm or dissection.    RETROPERITONEUM:  No mass or adenopathy.     BOWEL/MESENTERY:  No visible mass, obstruction, or bowel wall thickening.  Normal appendix  ABDOMINAL WALL:  No mass or hernia.    URINARY BLADDER:  No visible focal wall thickening, lesion, or calculus.    PELVIC NODES:  No adenopathy.    PELVIC ORGANS:  No visible mass.  Pelvic organs appropriate for patient age.  Small pelvic free fluid, which may be physiologic.  BONES:  No bony lesion or fracture.    LUNG BASES:  No visible pulmonary or pleural disease.    OTHER:  Negative.                    Impression  CONCLUSION:  No acute abnormality in the abdomen or pelvis.        LOCATION:  [facility]        Dictated by (CST): Kb Juarez MD on 2023 at 1:22 PM      Finalized by (CST): Kb Juarez MD on 2023 at 1:27 PM    Assessment:     Leigh Abbasi is a 44 year old  female h/o menorrhagia. Persistent issues with left breast pain, breast density. +FHx ovarian cancer in her mother & matGM.     Here for [employer]. Menorrhagia persists. Periods still regular. Some night sweats around the periods. Incidental blue-gray tinged lesion at 9 o'clock on cervix biopsied.     Diagnoses and all orders for this visit:    Encounter for well woman exam with abnormal findings    Screening for cervical cancer  -     ThinPrep PAP Smear; Future  -     Hpv Dna  High Risk , Thin Prep Collect; Future    Dense breasts    Breast implant status    Human papilloma virus (HPV) vaccination declined  -     Gardasil 9 [00167]    Menorrhagia with regular cycle  -     US PELVIS W EV (CPT=76856/86037); Future    Family history of ovarian cancer  -     -II [08272] [Q]  -     US PELVIS W EV (CPT=76856/71309); Future    Screening for ovarian cancer  -     -II [55165] [Q]  -     US PELVIS W EV (CPT=76856/77832); Future    Cervix abnormality  -     Specimen to pathology , tissue           Plan:     Cervical lesion  -2025 Incidentally noted approximately 5 mm irregularly bordered blue-gray area located at 9 o'clock on the  ectocervix. Biopsy taken. This could actually just have been an unusual appearing nabothian cyst, but specimen to pathology. Monsel's applied.     11/17/22 Pap & HPV negative  -up to date per guidelines. Pt prefers pap is done today, 5/8/2025     Breast issues - Left breast pain, implants, dense  10/10/22 Screening mammogram dense, benign  3/14/23 Diagnostic mammogram - tiny benign cyst left breast. Benign   3/26/24 Diagnostic mammogram. Cat C. No findings. Given symptoms, recommend MRI breasts  MRI breasts & screening mammo were ordered 6/18/24 per breast surgeon Dr. Ford  5/8/2025 No complaints. Breast exam benign, +implants     HPV vaccine - declines   Contraception vasectomy     FHx ovarian cancer in mother & MGM  -genetic counseling offered but patient chose to proceed directly with genetic testing  -6/2022 [facility] Hereditary mutation testing NEGATIVE  -6/1/22 - Discussed consideration of surveillance with yearly pelvic US & CA-125 though this screening strategy is not perfect and is subject to both false positives and false negatives, possible unnecessary surgery  -5/8/2025 Discussed option of yearly pelvic US & CA-125 - pt would like to do it.      STD screening: declines  Colonoscopy 8/21/2024. Recall 10 years  BMI normal   Derm encouraged for moles, freckles     PCP encouraged  Did some labs with an outside facility early 2025 - \"[facility]\" - cholesterol & thyroid looked good. Was not anemic but low vitamin D. Not sure if diabetes screen was done    Menorrhagia (and spotting in 6/2022)  -6/1/22 c/o intermenstrual spotting & menorrhagia. Retained tampon removed. Pelvic US & labs ordered.   -Labs & pelvic US from 6/2022 not done.   -CT a/p with normal pelvic organs 12/31/23.   -5/8/25 Pelvic US ordered. Discussed various options including OCP, LNG-IUD, Nexplanon, endometrial ablation, hysterectomy, tranexamic acid. Declines.     RTC 1 year for [employer] after 5/8/26    Nirali Vazquez MD  [facility] - OBGYN

## 2025-05-08 NOTE — PATIENT INSTRUCTIONS
Patient Information about GARDASIL®9 (pronounced “jmqv-Wu-iowg n?n”)  (Human Papillomavirus 9-valent Vaccine, Recombinant)    IT IS RECOMMENDED THAT YOU CHECK WITH YOUR INSURANCE ABOUT COVERAGE FOR THIS VACCINE PRIOR TO ITS ADMINISTRATION    Read this information with care before getting GARDASIL®9. You or your child (the person getting  GARDASIL 9) will need 2 or 3 doses of the vaccine, depending on how old you are. It is important to read  this information before getting each dose. This information does not take the place of talking with your  health care professional about GARDASIL 9.    What is GARDASIL 9?    GARDASIL 9 is a vaccine (injection/shot) given to individuals 9 through 45 years of age to help protect  against diseases caused by some types of Human Papillomavirus (HPV).    What diseases can GARDASIL 9 help protect against?    In girls and women 9 through 45 years of age, GARDASIL 9 helps protect against:  ? Cervical cancer  ? Vulvar and vaginal cancers  ? Anal cancer  ? Certain head and neck cancers, such as throat and back of mouth cancers  ? Precancerous cervical, vulvar, vaginal and anal lesions  ? Genital warts  In boys and men 9 through 45 years of age, GARDASIL 9 helps protect against:  ? Anal cancer  ? Certain head and neck cancers, such as throat and back of mouth cancers  ? Precancerous anal lesions  ? Genital warts    These diseases may have many causes, including HPV Types 6, 11, 16, 18, 31, 33, 45, 52, and 58.  GARDASIL 9 only protects against diseases caused by these nine types of HPV.  People cannot get HPV or any of these diseases from GARDASIL 9.    What important information about GARDASIL 9 should I know?    GARDASIL 9:  ? Does not remove the need for screening for cervical, vulvar, vaginal, anal, and certain head and  neck cancers, such as throat and back of mouth cancers as recommended by a health care  professional; women should still get routine cervical cancer screening.  ? Does  not protect the person getting GARDASIL 9 from a disease that is caused by other types of  HPV, other viruses or bacteria.  ? Does not treat HPV infection.  ? Does not protect the person getting GARDASIL 9 from HPV types that he/she may already have.  GARDASIL 9 may not fully protect each person who gets it.    Who should not get GARDASIL 9?    Anyone with an allergic reaction to:  ? A previous dose of GARDASIL 9  ? A previous dose of GARDASIL®  ? Yeast (severe allergic reaction)  ? Amorphous aluminum hydroxyphosphate sulfate  ? Polysorbate 80    What should I tell the health care professional before getting GARDASIL 9?    Tell the health care professional if you or your child (the person getting GARDASIL 9):  ? Are pregnant or planning to get pregnant.  ? Have immune problems, like HIV or cancer.  ? Take medicines that affect the immune system.  ? Have a fever over 100°F (37.8°C).  ? Might have had an allergic reaction to a previous dose of GARDASIL 9 or GARDASIL.  ? Take any medicines, even those you can buy over the counter.  The health care professional will help decide if you or your child should get the vaccine.    How is GARDASIL 9 given?    GARDASIL 9 is a shot that is usually given in the arm muscle. GARDASIL 9 may be given as 2 or 3  shots.  For persons who are   9 through 14 years old   2-shots* Dose 1: first shot  Dose 2: second shot given between 6 and 12 months  after the first shot    or 3-shots** Dose 1: first shot  Dose 2: second shot given 2 months after the first shot  Dose 3: third shot given 6 months after the first shot    15 through 45 years old 3-shots   Dose 1: first shot  Dose 2: second shot given 2 months after the first shot  Dose 3: third shot given 6 months after the first shot    *If the second shot is given earlier than 5 months after the first shot, you will need to get a third shot at  least 4 months after the second shot was given.    **The need to use a 3-dose schedule instead of  a 2-dose schedule will be determined by your health care  Professional.    Make sure that you or your child gets all doses recommended by your health care professional so that  you or your child gets the best protection. If the person getting GARDASIL 9 misses a dose, tell the health  care professional and they will decide when to give the missed dose. It is important that you follow the  instructions of your health care professional regarding return visits for the follow-up doses.  Fainting can happen after getting an HPV vaccine. Sometimes people who faint can fall and hurt  themselves. For this reason, the health care professional may ask the person getting GARDASIL 9 to sit  or lie down for 15 minutes after getting the vaccine. Some people who faint might shake or become stiff.  The health care professional may need to treat the person getting GARDASIL 9.    Can I get GARDASIL 9 if I have already gotten GARDASIL?    If you have already gotten GARDASIL, talk to your health care professional to see if GARDASIL 9 is right  for you.    Can I get GARDASIL 9 with other vaccines?    GARDASIL 9 can be given at the same time as:  ? Menactra [Meningococcal (Groups A, C, Y and W-135) Polysaccharide Diphtheria Toxoid  Conjugate Vaccine]  ? Adacel [Tetanus Toxoid, Reduced Diphtheria Toxoid and Acellular Pertussis Vaccine Adsorbed  (Tdap)]    What are the possible side effects of GARDASIL 9?    The most common side effects seen with GARDASIL 9 are:  ? pain, swelling, redness, itching, bruising, bleeding, and a lump where you got the shot  ? headache  ? fever  ? nausea  ? dizziness  ? tiredness  ? diarrhea  ? abdominal pain  ? sore throat    Studies show that there was more swelling where the shot was given when GARDASIL 9 was given at  the same time as Menactra and/or Adacel.    Tell the health care professional if you have any of these problems because these may be signs of an  allergic reaction:  ? difficulty breathing  ?  wheezing (bronchospasm)  ? hives  ? rash    Additional side effects that have been reported during general use for GARDASIL 9 are shown below.  Side effects reported during the general use of GARDASIL are also shown below. GARDASIL side effects  are reported as they may be relevant to GARDASIL 9 since the vaccines are similar in composition.  GARDASIL 9  ? vomiting  ? hives    Additionally, these side effects have been seen with the general use of GARDASIL.  ? swollen glands (neck, armpit, or groin)  ? joint pain  ? unusual tiredness, weakness, or confusion  ? chills  ? generally feeling unwell  ? leg pain  ? shortness of breath  ? chest pain  ? aching muscles  ? muscle weakness  ? seizure  ? bad stomach ache  ? bleeding or bruising more easily than normal  ? skin infection    You should contact your health care professional right away if you get any symptoms that bother you.  For a more complete list of side effects, ask the health care professional.    Call your health care professional for medical advice about side effects. You may also report any side  effects to your doctor or directly to Vaccine Adverse Event Reporting System (VAERS). The VAERS tollfree number is 1-292.688.5144 or report online to www.vaers.[facility].gov.    GARDASIL 9 was not studied in women who knew they were pregnant. A pregnancy registry is available.  You are encouraged to contact the registry as soon as you become aware of your pregnancy by calling 1- [phone], or ask your health care professional to contact the registry for you.    What is in GARDASIL 9?    GARDASIL 9 contains:  ? Proteins of HPV Types 6, 11, 16, 18, 31, 33, 45, 52, and 58  ? Amorphous aluminum hydroxyphosphate sulfate  ? Yeast protein  ? Sodium chloride  ? L-histidine  ? Polysorbate 80  ? Sodium borate  ? Water    This document is a summary of information about GARDASIL 9.    To learn more about GARDASIL 9, please talk to the health care professional or  visit  [email or web address].    For patent information: [email or web address]  The trademarks depicted herein are owned by their respective companies.  Copyright © 3954-6364 [employer] a subsidiary of [employer]  All rights reserved.  Revised: 06/2020  [id number]

## 2025-05-09 LAB — HPV E6+E7 MRNA CVX QL NAA+PROBE: NEGATIVE

## 2025-05-28 ENCOUNTER — HOSPITAL ENCOUNTER (OUTPATIENT)
Dept: ULTRASOUND IMAGING | Age: 45
Discharge: HOME | End: 2025-05-28
Attending: OBSTETRICS & GYNECOLOGY
Payer: COMMERCIAL

## 2025-05-28 DIAGNOSIS — N92.0 MENORRHAGIA WITH REGULAR CYCLE: ICD-10-CM

## 2025-05-28 DIAGNOSIS — Z80.41 FAMILY HISTORY OF OVARIAN CANCER: ICD-10-CM

## 2025-05-28 DIAGNOSIS — Z12.73 SCREENING FOR OVARIAN CANCER: ICD-10-CM

## 2025-05-28 PROCEDURE — 76830 TRANSVAGINAL US NON-OB: CPT | Performed by: OBSTETRICS & GYNECOLOGY

## 2025-05-28 PROCEDURE — 76856 US EXAM PELVIC COMPLETE: CPT | Performed by: OBSTETRICS & GYNECOLOGY

## 2025-05-29 ENCOUNTER — TELEPHONE (OUTPATIENT)
Facility: CLINIC | Age: 45
End: 2025-05-29

## 2025-05-29 NOTE — PROGRESS NOTES
Unable to leave a message for the patient to call back for test results. Mailbox is full. Will send a [employer] message to call the office.

## 2025-05-29 NOTE — PROGRESS NOTES
Patient aware of US results and recommendations. Pelvic US images & report reviewed. LMP 5/20/25 means this ultrasound was done on cycle day 9. Uterus normal size with normal appearing endometrium. Left ovary with 1.5 cm follicle (this is   physiologic).     I would recommend endometrial biopsy at minimum to make sure we don't think she has any precancerous or cancerous cells of the endometrium in there and she can consider if she wants to do anything   further like we discussed at 5/8/25 office visit (e.g. OCP, Mirena progestin containing IUD, Nexplanon, endometrial ablation, hysterectomy, tranexamic acid.)     If she is interested in endometrial ablation, could skip the endometrial biopsy & proceed to OR for hysteroscopy, dilation and curettage, endometrial ablation at that time.   Patient would like to start with the EMB. Scheduled for 8/4/25.   Patient verbalized understanding.   Detail Level: Detailed

## 2025-08-04 ENCOUNTER — OFFICE VISIT (OUTPATIENT)
Facility: CLINIC | Age: 45
End: 2025-08-04

## 2025-08-04 VITALS
HEART RATE: 55 BPM | HEIGHT: 65 IN | WEIGHT: 121 LBS | DIASTOLIC BLOOD PRESSURE: 78 MMHG | SYSTOLIC BLOOD PRESSURE: 110 MMHG | BODY MASS INDEX: 20.16 KG/M2

## 2025-08-04 DIAGNOSIS — N92.0 MENORRHAGIA WITH REGULAR CYCLE: Primary | ICD-10-CM

## 2025-08-04 LAB
CONTROL LINE PRESENT WITH A CLEAR BACKGROUND (YES/NO): YES YES/NO
KIT LOT #: NORMAL NUMERIC
PREGNANCY TEST, URINE: NEGATIVE

## 2025-08-04 PROCEDURE — 88305 TISSUE EXAM BY PATHOLOGIST: CPT | Performed by: OBSTETRICS & GYNECOLOGY

## (undated) NOTE — Clinical Note
Thank you for this referral! I ordered her imaging and copied you on results. Apparently she's had trouble with insurance covering MRI. Thanks, Kayla

## (undated) NOTE — LETTER
Leigh Abbasi, :6/3/1980    CONSENT FOR PROCEDURE/SEDATION    1. I authorize the performance upon Leigh Abbasi  the following: Cervical Biopsy    2. I authorize Dr. Nirali Vazquez MD (and whomever is designated as the doctor’s assistant), to perform the above-mentioned procedures.    3. If any unforeseen conditions arise during this procedure calling for additional  procedures, operations, or medications (including anesthesia and blood transfusion), I further request and authorize the doctor to do whatever he/she deems advisable in my interest.    4. I consent to the taking and reproduction of any photographs in the course of this procedure for professional purposes.    5. I consent to the administration of such sedation as may be considered necessary or advisable by the physician responsible for this service, with the exception of ______________________________________________________    6. I have been informed by my doctor of the nature and purpose of this procedure sedation, possible alternative methods of treatment, risk involved and possible complications.    7. If I have a Do Not Resuscitate (DNR) order in place, the physician and I (or the individual authorized to consent on my behalf) will discuss and agree as to whether the Do Not Resuscitate (DNR) order will remain in effect or will be discontinued during the performance of the procedure and the applicable recovery period. If the Do Not Resuscitate (DNR) order is discontinued and is to be reinstated following the procedure/recovery period, the physician will determine when the applicable recovery period ends for purposes of reinstating the Do Not Resuscitate (DNR) order.    Signature of Patient:_______________________________________________    Signature of person authorized to consent for patient:  _______________________________________________________________    Relationship to patient:  ____________________________________________    Witness: _________________________________________ Date:___________     Physician Signature: _______________________________ Date:___________